# Patient Record
Sex: FEMALE | Race: WHITE | NOT HISPANIC OR LATINO
[De-identification: names, ages, dates, MRNs, and addresses within clinical notes are randomized per-mention and may not be internally consistent; named-entity substitution may affect disease eponyms.]

---

## 2019-07-25 PROBLEM — Z00.00 ENCOUNTER FOR PREVENTIVE HEALTH EXAMINATION: Status: ACTIVE | Noted: 2019-07-25

## 2019-08-12 ENCOUNTER — APPOINTMENT (OUTPATIENT)
Dept: BREAST CENTER | Facility: CLINIC | Age: 54
End: 2019-08-12
Payer: COMMERCIAL

## 2019-08-12 VITALS — BODY MASS INDEX: 28.34 KG/M2 | WEIGHT: 154 LBS | HEIGHT: 62 IN

## 2019-08-12 VITALS — DIASTOLIC BLOOD PRESSURE: 59 MMHG | HEART RATE: 66 BPM | SYSTOLIC BLOOD PRESSURE: 107 MMHG

## 2019-08-12 DIAGNOSIS — H81.01 MENIERE'S DISEASE, RIGHT EAR: ICD-10-CM

## 2019-08-12 DIAGNOSIS — N63.20 UNSPECIFIED LUMP IN THE LEFT BREAST, UNSPECIFIED QUADRANT: ICD-10-CM

## 2019-08-12 DIAGNOSIS — Z78.9 OTHER SPECIFIED HEALTH STATUS: ICD-10-CM

## 2019-08-12 DIAGNOSIS — R92.8 OTHER ABNORMAL AND INCONCLUSIVE FINDINGS ON DIAGNOSTIC IMAGING OF BREAST: ICD-10-CM

## 2019-08-12 DIAGNOSIS — Z80.9 FAMILY HISTORY OF MALIGNANT NEOPLASM, UNSPECIFIED: ICD-10-CM

## 2019-08-12 PROCEDURE — 99243 OFF/OP CNSLTJ NEW/EST LOW 30: CPT

## 2019-08-12 RX ORDER — TRIAMTERENE AND HYDROCHLOROTHIAZIDE 25; 37.5 MG/1; MG/1
37.5-25 TABLET ORAL
Refills: 0 | Status: ACTIVE | COMMUNITY

## 2019-08-12 RX ORDER — OMEGA-3/DHA/EPA/FISH OIL 300-1000MG
CAPSULE ORAL
Refills: 0 | Status: ACTIVE | COMMUNITY

## 2019-08-12 NOTE — HISTORY OF PRESENT ILLNESS
[FreeTextEntry1] : 53 year old female referred by Dr. Gavin Saleh (OB/GYN) presents for consultation regarding left breast fibroepithelial lesion found on recent core biopsy. \par \par Denies any palpable abnormalities, no skin changes no nipple discharge. Patient states she has had left breast lesion 1:00 since first detected in 2008.\par 7/12/19--b/l smmg/US, left dx mmg results showing breasts composed of greater amounts of fat than fibroglandular tissue, left breast 1:00 1cm nodule with somewhat lobulated appearance slightly increased in size, recommended for US guided core biopsy. BR 4. \par 7/22/19 ([facility])--left breast 1:00 5cmFN US core pathology cellular fibroepithelial lesion, excision required for a more definitive diagnosis. \par \par 2 pregnancies. 1 child (1-boy). LMP 7/29/19\par No family h/o breast or ovarian cancer\par Family hx: maternal grandmother dx with with unknown cancer in her late 80's. \par

## 2019-08-12 NOTE — PHYSICAL EXAM
[Bra Size: ___] : Bra Size: [unfilled] [Normocephalic] : normocephalic [Atraumatic] : atraumatic [Supple] : supple [No Supraclavicular Adenopathy] : no supraclavicular adenopathy [Examined in the supine and seated position] : examined in the supine and seated position [No Thyromegaly] : no thyromegaly [No dominant masses] : no dominant masses in right breast  [No dominant masses] : no dominant masses left breast [No Nipple Retraction] : no left nipple retraction [No Nipple Discharge] : no left nipple discharge [No Axillary Lymphadenopathy] : no left axillary lymphadenopathy [No Edema] : no edema [No Swelling] : no swelling [No Rashes] : no rashes [Full ROM] : full range of motion [No Ulceration] : no ulceration

## 2019-08-12 NOTE — PAST MEDICAL HISTORY
[Menarche Age ____] : age at menarche was [unfilled] [Definite ___ (Date)] : the last menstrual period was [unfilled] [Live Births ___] : P[unfilled]  [Total Preg ___] : G[unfilled] [Living ___] : Living: [unfilled] [Age At Live Birth ___] : Age at live birth: [unfilled] [FreeTextEntry6] : IVF treatment (at least 6 cycles)  [FreeTextEntry5] :   [FreeTextEntry8] : x 6 months  [FreeTextEntry7] : OCP use for >10 years (Loestrin)

## 2019-08-12 NOTE — ASSESSMENT
[FreeTextEntry1] : 53 year old female referred by Dr. Gavin Saleh (OB/GYN) presents for consultation regarding left breast fibroepithelial lesion found on recent core biopsy. \par \par Denies any palpable abnormalities, no skin changes no nipple discharge. Patient states she has had left breast lesion 1:00 since first detected in 2008.\par 7/12/19--b/l smmg/US, left dx mmg results showing breasts composed of greater amounts of fat than fibroglandular tissue, left breast 1:00 1cm nodule with somewhat lobulated appearance slightly increased in size, recommended for US guided core biopsy. BR 4. \par 7/22/19 ([facility])--left breast 1:00 5cmFN US core pathology cellular fibroepithelial lesion, excision required for a more definitive diagnosis. \par \par 2 pregnancies. 1 child (1-boy). LMP 7/29/19\par No family h/o breast or ovarian cancer\par Family hx: maternal grandmother dx with with unknown cancer in her late 80's. \par CBE:ALETHEA.\par Reviewed with NAEEM BLAKELY  the results of  L  breast biopsy from [facility] on  07/22/2019 showing cellular fibroepithelial lesion . Rec is for (localized) excisional biopsy given pathology.  Reviewed the procedure for (wire localized vs malcom ) excisional biopsy with pros and cons of either. Pt opts for the wire localized exc bx. May need additonal treatment, including another surgery pending final surgical pathology, ie: phyllodes, malignancy, atypia etc. Reviewed surgery and risks and complications including, pain, anesth, infection, bruising, bleeding. Pt would prefer IV sedation over general. Agrees to proceed with scheduling the surgery. Will need slide review and CDs prior to surgery. \par

## 2019-08-20 ENCOUNTER — RESULT REVIEW (OUTPATIENT)
Age: 54
End: 2019-08-20

## 2019-08-20 ENCOUNTER — OUTPATIENT (OUTPATIENT)
Dept: OUTPATIENT SERVICES | Facility: HOSPITAL | Age: 54
LOS: 1 days | End: 2019-08-20
Payer: COMMERCIAL

## 2019-08-20 DIAGNOSIS — D24.2 BENIGN NEOPLASM OF LEFT BREAST: ICD-10-CM

## 2019-08-20 PROCEDURE — 88321 CONSLTJ&REPRT SLD PREP ELSWR: CPT

## 2019-08-21 LAB — SURGICAL PATHOLOGY STUDY: SIGNIFICANT CHANGE UP

## 2019-09-16 ENCOUNTER — MESSAGE (OUTPATIENT)
Age: 54
End: 2019-09-16

## 2019-09-17 VITALS
OXYGEN SATURATION: 100 % | RESPIRATION RATE: 16 BRPM | HEIGHT: 62 IN | DIASTOLIC BLOOD PRESSURE: 58 MMHG | WEIGHT: 153 LBS | SYSTOLIC BLOOD PRESSURE: 114 MMHG | TEMPERATURE: 98 F | HEART RATE: 67 BPM

## 2019-09-17 NOTE — ASU PATIENT PROFILE, ADULT - PMH
Meniere disease    Neoplasm  benign neoplasm of the left breast  Thalassemia minor HTN (hypertension)    Meniere disease    Neoplasm  benign neoplasm of the left breast  Thalassemia minor

## 2019-09-18 ENCOUNTER — APPOINTMENT (OUTPATIENT)
Dept: MAMMOGRAPHY | Facility: HOSPITAL | Age: 54
End: 2019-09-18

## 2019-09-18 ENCOUNTER — OUTPATIENT (OUTPATIENT)
Dept: OUTPATIENT SERVICES | Facility: HOSPITAL | Age: 54
LOS: 1 days | Discharge: HOME | End: 2019-09-18
Payer: COMMERCIAL

## 2019-09-18 ENCOUNTER — RESULT REVIEW (OUTPATIENT)
Age: 54
End: 2019-09-18

## 2019-09-18 ENCOUNTER — APPOINTMENT (OUTPATIENT)
Dept: BREAST CENTER | Facility: HOSPITAL | Age: 54
End: 2019-09-18
Payer: COMMERCIAL

## 2019-09-18 VITALS
DIASTOLIC BLOOD PRESSURE: 58 MMHG | OXYGEN SATURATION: 99 % | RESPIRATION RATE: 15 BRPM | TEMPERATURE: 98 F | SYSTOLIC BLOOD PRESSURE: 94 MMHG | HEART RATE: 71 BPM

## 2019-09-18 DIAGNOSIS — L05.91 PILONIDAL CYST WITHOUT ABSCESS: Chronic | ICD-10-CM

## 2019-09-18 PROCEDURE — 19281 PERQ DEVICE BREAST 1ST IMAG: CPT | Mod: LT

## 2019-09-18 PROCEDURE — 19125 EXCISION BREAST LESION: CPT

## 2019-09-18 PROCEDURE — C1819: CPT

## 2019-09-18 PROCEDURE — 19281 PERQ DEVICE BREAST 1ST IMAG: CPT

## 2019-09-18 PROCEDURE — 76098 X-RAY EXAM SURGICAL SPECIMEN: CPT | Mod: 26

## 2019-09-18 PROCEDURE — 19125 EXCISION BREAST LESION: CPT | Mod: LT

## 2019-09-18 PROCEDURE — 88305 TISSUE EXAM BY PATHOLOGIST: CPT

## 2019-09-18 PROCEDURE — 88307 TISSUE EXAM BY PATHOLOGIST: CPT

## 2019-09-18 PROCEDURE — 76098 X-RAY EXAM SURGICAL SPECIMEN: CPT

## 2019-09-18 RX ORDER — ACETAMINOPHEN 500 MG
650 TABLET ORAL EVERY 6 HOURS
Refills: 0 | Status: DISCONTINUED | OUTPATIENT
Start: 2019-09-18 | End: 2019-09-18

## 2019-09-18 RX ORDER — ACETAMINOPHEN WITH CODEINE 300MG-30MG
1 TABLET ORAL
Qty: 5 | Refills: 0
Start: 2019-09-18

## 2019-09-18 RX ORDER — ONDANSETRON 8 MG/1
4 TABLET, FILM COATED ORAL ONCE
Refills: 0 | Status: DISCONTINUED | OUTPATIENT
Start: 2019-09-18 | End: 2019-09-18

## 2019-09-18 RX ORDER — HYDROMORPHONE HYDROCHLORIDE 2 MG/ML
0.5 INJECTION INTRAMUSCULAR; INTRAVENOUS; SUBCUTANEOUS ONCE
Refills: 0 | Status: DISCONTINUED | OUTPATIENT
Start: 2019-09-18 | End: 2019-09-18

## 2019-09-18 NOTE — PACU DISCHARGE NOTE - COMMENTS
Patient meets criteria for patient discharge, A&Ox4, VSS, No pain reported, Ace bandage wrap around chest in place as MD placed it, No discharge seen, patient up in recliner-drank water and cranberry juice, ate august and ritz crackers with no nausea, patient voided 350ml-walked to bathroom with standby assist without dizziness, # 20 gauge IV removed fully intact from right hand, discharge instruction reviewed with patient-all questions answered- patient verbalized understanding with read back of instructions, patient will call Dr. Cate Fish in AM to make followup appointment, patient with be driven home by , patient and  escorted to lobby with patient in wheelchair by SHELLI Barajas.

## 2019-09-18 NOTE — BRIEF OPERATIVE NOTE - OPERATION/FINDINGS
Left breast mass removed with the help of needle localisation, confirmed with specimen radiography. Haemostasis achieved. Skin closed with vicryl and monocryl Left breast mass removed with the help of needle localization, confirmed with specimen radiography, prior clip also removed. Hemostasis achieved. Skin closed with vicryl and monocryl

## 2019-09-20 PROBLEM — D56.3 THALASSEMIA MINOR: Chronic | Status: ACTIVE | Noted: 2019-09-17

## 2019-09-20 PROBLEM — H81.09 MENIERE'S DISEASE, UNSPECIFIED EAR: Chronic | Status: ACTIVE | Noted: 2019-09-17

## 2019-09-20 PROBLEM — D49.9 NEOPLASM OF UNSPECIFIED BEHAVIOR OF UNSPECIFIED SITE: Chronic | Status: ACTIVE | Noted: 2019-09-17

## 2019-09-25 LAB — SURGICAL PATHOLOGY STUDY: SIGNIFICANT CHANGE UP

## 2019-09-27 ENCOUNTER — APPOINTMENT (OUTPATIENT)
Dept: BREAST CENTER | Facility: CLINIC | Age: 54
End: 2019-09-27
Payer: COMMERCIAL

## 2019-09-27 VITALS
HEIGHT: 62 IN | SYSTOLIC BLOOD PRESSURE: 89 MMHG | WEIGHT: 163 LBS | BODY MASS INDEX: 30 KG/M2 | HEART RATE: 68 BPM | DIASTOLIC BLOOD PRESSURE: 52 MMHG

## 2019-09-27 PROCEDURE — 99024 POSTOP FOLLOW-UP VISIT: CPT

## 2019-09-27 NOTE — HISTORY OF PRESENT ILLNESS
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 Left breast fibroepithelial lesion additional posterior margin pathology breast parenchyma with fibrocystic changes. VIRAL risk calculator 32% \par Pt doing well post op. \par \par 7/12/19--b/l smmg/US, left dx mmg results showing breasts composed of greater amounts of fat than fibroglandular tissue, left breast 1:00 1cm nodule with somewhat lobulated appearance slightly increased in size, recommended for US guided core biopsy. BR 4. \par 7/22/19 ([facility])--left breast 1:00 5cmFN US core pathology cellular fibroepithelial lesion, excision required for a more definitive diagnosis. \par 9/18/19 (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 Left breast fibroepithelial lesion additional posterior margin pathology breast parenchyma with fibrocystic changes. \par \par 2 pregnancies. 1 child (1-boy). LMP 7/29/19\par No family h/o breast or ovarian cancer\par Family hx: maternal grandmother dx with with unknown cancer in her late 80's. \par

## 2019-09-27 NOTE — DATA REVIEWED
[FreeTextEntry1] : 7/12/19--b/l smmg/US, left dx mmg results showing breasts composed of greater amounts of fat than fibroglandular tissue, left breast 1:00 1cm nodule with somewhat lobulated appearance slightly increased in size, recommended for US guided core biopsy. BR 4. \par 7/22/19 ([facility])--left breast 1:00 5cmFN US core pathology cellular fibroepithelial lesion, excision required for a more definitive diagnosis. 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 Left breast fibroepithelial lesion additional posterior margin pathology breast parenchyma with fibrocystic changes.

## 2019-09-27 NOTE — ASSESSMENT
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 Left breast fibroepithelial lesion additional posterior margin pathology breast parenchyma with fibrocystic changes. VIRAL risk calculator 32% \par Pt doing well post op. \par \par 7/12/19--b/l smmg/US, left dx mmg results showing breasts composed of greater amounts of fat than fibroglandular tissue, left breast 1:00 1cm nodule with somewhat lobulated appearance slightly increased in size, recommended for US guided core biopsy. BR 4. \par 7/22/19 ([facility])--left breast 1:00 5cmFN US core pathology cellular fibroepithelial lesion, excision required for a more definitive diagnosis. \par 9/18/19 (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 Left breast fibroepithelial lesion additional posterior margin pathology breast parenchyma with fibrocystic changes. \par \par 2 pregnancies. 1 child (1-boy). LMP 7/29/19\par No family h/o breast or ovarian cancer\par Family hx: maternal grandmother dx with with unknown cancer in her late 80's. \par CBE: Left PA incision intact and healing well. No cellulitis\par Reviewed with pt, results of pathology. Benign phyllodes, negative margins, reexcision not needed. Discussed risk of recurrence and would rec close f/u.  There is also ADH and increased LT risk of breast cancer. Rec medical oncology consult, card for Dr. Causey given.  Also high risk and rec alternating 6 mos mmg and u/s with MRI. Can do bilat 2/20 and MRI 8/20, works better for pt's schedule.

## 2019-11-08 ENCOUNTER — APPOINTMENT (OUTPATIENT)
Dept: BREAST CENTER | Facility: CLINIC | Age: 54
End: 2019-11-08

## 2019-11-25 ENCOUNTER — APPOINTMENT (OUTPATIENT)
Dept: BREAST CENTER | Facility: CLINIC | Age: 54
End: 2019-11-25
Payer: COMMERCIAL

## 2019-11-25 VITALS
SYSTOLIC BLOOD PRESSURE: 123 MMHG | TEMPERATURE: 98.4 F | HEART RATE: 76 BPM | HEIGHT: 62 IN | DIASTOLIC BLOOD PRESSURE: 69 MMHG

## 2019-11-25 PROCEDURE — 99024 POSTOP FOLLOW-UP VISIT: CPT

## 2019-11-25 NOTE — HISTORY OF PRESENT ILLNESS
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 Left breast fibroepithelial lesion additional posterior margin pathology breast parenchyma with fibrocystic changes. VIRAL risk calculator 32% \par Pt doing well, got a massage 3 weeks ago and laid on left breast and now slightly tender but not requiring Rx.  \par Saw Dr. Causey and still thinking about the tamoxifen. \par \par 7/12/19--b/l smmg/US, left dx mmg results showing breasts composed of greater amounts of fat than fibroglandular tissue, left breast 1:00 1cm nodule with somewhat lobulated appearance slightly increased in size, recommended for US guided core biopsy. BR 4. \par 7/22/19 ([facility])--left breast 1:00 5cmFN US core pathology cellular fibroepithelial lesion, excision required for a more definitive diagnosis. \par 9/18/19 (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 Left breast fibroepithelial lesion additional posterior margin pathology breast parenchyma with fibrocystic changes. \par \par 2 pregnancies. 1 child (1-boy). LMP 7/29/19\par No family h/o breast or ovarian cancer\par Family hx: maternal grandmother dx with with unknown cancer in her late 80's. \par \par \par  \par

## 2019-11-25 NOTE — ASSESSMENT
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 Left breast fibroepithelial lesion additional posterior margin pathology breast parenchyma with fibrocystic changes. VIRAL risk calculator 32% \par Pt doing well, got a massage 3 weeks ago and laid on left breast and now slightly tender but not requiring Rx.  \par Saw Dr. Causey and still thinking about the tamoxifen. \par \par 7/12/19--b/l smmg/US, left dx mmg results showing breasts composed of greater amounts of fat than fibroglandular tissue, left breast 1:00 1cm nodule with somewhat lobulated appearance slightly increased in size, recommended for US guided core biopsy. BR 4. \par 7/22/19 ([facility])--left breast 1:00 5cmFN US core pathology cellular fibroepithelial lesion, excision required for a more definitive diagnosis. 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 Left breast fibroepithelial lesion additional posterior margin pathology breast parenchyma with fibrocystic changes. \par \par 2 pregnancies. 1 child (1-boy). LMP 7/29/19\par No family h/o breast or ovarian cancer\par Family hx: maternal grandmother dx with with unknown cancer in her late 80's. \par CBE: Left PA inc healing well. \par

## 2020-02-13 ENCOUNTER — FORM ENCOUNTER (OUTPATIENT)
Age: 55
End: 2020-02-13

## 2020-02-14 ENCOUNTER — OUTPATIENT (OUTPATIENT)
Dept: OUTPATIENT SERVICES | Facility: HOSPITAL | Age: 55
LOS: 1 days | End: 2020-02-14
Payer: COMMERCIAL

## 2020-02-14 ENCOUNTER — APPOINTMENT (OUTPATIENT)
Dept: MRI IMAGING | Facility: HOSPITAL | Age: 55
End: 2020-02-14
Payer: COMMERCIAL

## 2020-02-14 DIAGNOSIS — N60.01 SOLITARY CYST OF RIGHT BREAST: ICD-10-CM

## 2020-02-14 DIAGNOSIS — L05.91 PILONIDAL CYST WITHOUT ABSCESS: Chronic | ICD-10-CM

## 2020-02-14 DIAGNOSIS — Z90.12 ACQUIRED ABSENCE OF LEFT BREAST AND NIPPLE: ICD-10-CM

## 2020-02-14 PROCEDURE — A9585: CPT

## 2020-02-14 PROCEDURE — C8908: CPT

## 2020-02-14 PROCEDURE — C8937: CPT

## 2020-02-14 PROCEDURE — 77049 MRI BREAST C-+ W/CAD BI: CPT | Mod: 26

## 2020-02-14 PROCEDURE — 77049 MRI BREAST C-+ W/CAD BI: CPT

## 2020-05-21 ENCOUNTER — APPOINTMENT (OUTPATIENT)
Dept: MAMMOGRAPHY | Facility: HOSPITAL | Age: 55
End: 2020-05-21

## 2020-05-21 ENCOUNTER — APPOINTMENT (OUTPATIENT)
Dept: ULTRASOUND IMAGING | Facility: HOSPITAL | Age: 55
End: 2020-05-21

## 2020-08-14 ENCOUNTER — APPOINTMENT (OUTPATIENT)
Dept: BREAST CENTER | Facility: CLINIC | Age: 55
End: 2020-08-14
Payer: COMMERCIAL

## 2020-08-14 VITALS
DIASTOLIC BLOOD PRESSURE: 66 MMHG | HEART RATE: 64 BPM | SYSTOLIC BLOOD PRESSURE: 104 MMHG | BODY MASS INDEX: 29.26 KG/M2 | WEIGHT: 159 LBS | HEIGHT: 62 IN

## 2020-08-14 DIAGNOSIS — D24.2 BENIGN NEOPLASM OF LEFT BREAST: ICD-10-CM

## 2020-08-14 DIAGNOSIS — R92.8 OTHER ABNORMAL AND INCONCLUSIVE FINDINGS ON DIAGNOSTIC IMAGING OF BREAST: ICD-10-CM

## 2020-08-14 PROCEDURE — 99214 OFFICE O/P EST MOD 30 MIN: CPT

## 2020-08-14 RX ORDER — FEXOFENADINE HCL 60 MG
CAPSULE ORAL
Refills: 0 | Status: DISCONTINUED | COMMUNITY
End: 2020-08-14

## 2020-08-14 NOTE — HISTORY OF PRESENT ILLNESS
[FreeTextEntry1] : 54 year old female former [facility] pt here for follow up.  S/P L wire loc excisional Bx for phyllodes tumor with small focus of ADH on 9/18/19.\par Pt is due for MRI f.u now from 2/20 MRI and she was to get bilat mmg 7/20 but was rescheduled to Oct per [facility]. \par Pt denies any breast lesions, discharge or masses.\par \par --2/14/20 [facility] MRI: baseline, correlated with mmg 7/19/19, 7/12/19, 8/17/16, scattered nonspecific foci noted chelly, R central (1.1cm) cyst w adjacent (0.4cm) focus of enhancement anteriorly, likely secondary to cyst inflammation; L UOQ post op changes, UOQ (2.4cm) post op seroma w thin rim of enhancement.  However there is a (0.6cm) focal area of slightly nodular enhancement at the anterior aspect of seroma. No axillary or IMLN adenopathy noted.BR3\par 6 mo f/u MRI rec for 6mm focal enhancement upper outer L to anterior aspect of seroma and R 1.1cm cyst central posterior.\par \par Was referred to Dr. Causey for discussion about starting tamoxifen and decided not to start it.  VIRAL risk calculator 32%\par \par --7/12/19--b/l smmg/US, left dx mmg results showing breasts composed of greater amounts of fat than fibroglandular tissue, left breast 1:00 1cm nodule with somewhat lobulated appearance slightly increased in size, recommended for US guided core biopsy. BR 4. \par --7/22/19 ([facility])--left breast 1:00 5cmFN US core pathology cellular fibroepithelial lesion, excision required for a more definitive diagnosis. 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 Left breast fibroepithelial lesion additional posterior margin pathology breast parenchyma with fibrocystic changes. \par \par No family h/o breast or ovarian cancer, mGM dx with with unknown cancer in her late 80's.

## 2020-08-14 NOTE — PHYSICAL EXAM
[Normocephalic] : normocephalic [No Supraclavicular Adenopathy] : no supraclavicular adenopathy [Atraumatic] : atraumatic [Supple] : supple [No Thyromegaly] : no thyromegaly [Symmetrical] : symmetrical [Examined in the supine and seated position] : examined in the supine and seated position [Bra Size: ___] : Bra Size: [unfilled] [No dominant masses] : no dominant masses in right breast  [No Nipple Retraction] : no left nipple retraction [No dominant masses] : no dominant masses left breast [No Nipple Discharge] : no left nipple discharge [No Axillary Lymphadenopathy] : no left axillary lymphadenopathy [No Swelling] : no swelling [No Edema] : no edema [No Ulceration] : no ulceration [Full ROM] : full range of motion [No Rashes] : no rashes [de-identified] : PA scar, well healed

## 2020-08-14 NOTE — ASSESSMENT
[FreeTextEntry1] : 54 year old female former [facility] pt here for follow up.  S/P L wire loc excisional Bx for phyllodes tumor with small focus of ADH on 9/18/19.\par Pt is due for MRI f.u now from 2/20 MRI and she was to get bilat mmg 7/20 but was rescheduled to Oct per [facility]. \par Pt denies any breast lesions, discharge or masses.\par \par --2/14/20 [facility] MRI: baseline, correlated with mmg 7/19/19, 7/12/19, 8/17/16, scattered nonspecific foci noted chelly, R central (1.1cm) cyst w adjacent (0.4cm) focus of enhancement anteriorly, likely secondary to cyst inflammation; L UOQ post op changes, UOQ (2.4cm) post op seroma w thin rim of enhancement.  However there is a (0.6cm) focal area of slightly nodular enhancement at the anterior aspect of seroma. No axillary or IMLN adenopathy noted.BR3\par 6 mo f/u MRI rec for 6mm focal enhancement upper outer L to anterior aspect of seroma and R 1.1cm cyst central posterior.\par \par Was referred to Dr. Causey for discussion about starting tamoxifen and decided not to start it.  VIRAL risk calculator 32%\par \par --7/12/19--b/l smmg/US, left dx mmg results showing breasts composed of greater amounts of fat than fibroglandular tissue, left breast 1:00 1cm nodule with somewhat lobulated appearance slightly increased in size, recommended for US guided core biopsy. BR 4. \par --7/22/19 ([facility])--left breast 1:00 5cmFN US core pathology cellular fibroepithelial lesion, excision required for a more definitive diagnosis. 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 Left breast fibroepithelial lesion additional posterior margin pathology breast parenchyma with fibrocystic changes. \par \par No family h/o breast or ovarian cancer, mGM dx with with unknown cancer in her late 80's. \par CBE: ALETHEA. Well healed PA scar on left. Full ROM and no lymphadenopathy.\par Reviewed results MRI with pt.  Rec is for 6 mos f.u MRI due now and bilat mmg and u/s also due now.  Further f.u and tx pending results.

## 2020-08-14 NOTE — PAST MEDICAL HISTORY
[Menarche Age ____] : age at menarche was [unfilled] [Total Preg ___] : G[unfilled] [Live Births ___] : P[unfilled]  [Living ___] : Living: [unfilled] [Age At Live Birth ___] : Age at live birth: [unfilled] [FreeTextEntry5] :   [FreeTextEntry7] : OCP use for >10 years (Loestrin)  [FreeTextEntry6] : IVF treatment (at least 6 cycles)  [FreeTextEntry8] : x 6 months

## 2020-09-22 ENCOUNTER — RESULT REVIEW (OUTPATIENT)
Age: 55
End: 2020-09-22

## 2020-09-22 ENCOUNTER — APPOINTMENT (OUTPATIENT)
Dept: MAMMOGRAPHY | Facility: HOSPITAL | Age: 55
End: 2020-09-22

## 2020-09-22 ENCOUNTER — OUTPATIENT (OUTPATIENT)
Dept: OUTPATIENT SERVICES | Facility: HOSPITAL | Age: 55
LOS: 1 days | End: 2020-09-22
Payer: COMMERCIAL

## 2020-09-22 ENCOUNTER — APPOINTMENT (OUTPATIENT)
Dept: ULTRASOUND IMAGING | Facility: HOSPITAL | Age: 55
End: 2020-09-22

## 2020-09-22 ENCOUNTER — APPOINTMENT (OUTPATIENT)
Dept: MRI IMAGING | Facility: HOSPITAL | Age: 55
End: 2020-09-22

## 2020-09-22 DIAGNOSIS — D24.2 BENIGN NEOPLASM OF LEFT BREAST: ICD-10-CM

## 2020-09-22 DIAGNOSIS — L05.91 PILONIDAL CYST WITHOUT ABSCESS: Chronic | ICD-10-CM

## 2020-09-22 DIAGNOSIS — N60.92 UNSPECIFIED BENIGN MAMMARY DYSPLASIA OF LEFT BREAST: ICD-10-CM

## 2020-09-22 PROCEDURE — 77049 MRI BREAST C-+ W/CAD BI: CPT | Mod: 26

## 2020-09-22 PROCEDURE — 77063 BREAST TOMOSYNTHESIS BI: CPT | Mod: 26

## 2020-09-22 PROCEDURE — 76641 ULTRASOUND BREAST COMPLETE: CPT | Mod: 26,50

## 2020-09-22 PROCEDURE — A9585: CPT

## 2020-09-22 PROCEDURE — 77063 BREAST TOMOSYNTHESIS BI: CPT

## 2020-09-22 PROCEDURE — 77067 SCR MAMMO BI INCL CAD: CPT | Mod: 26

## 2020-09-22 PROCEDURE — 77067 SCR MAMMO BI INCL CAD: CPT

## 2020-09-22 PROCEDURE — C8937: CPT

## 2020-09-22 PROCEDURE — C8908: CPT

## 2020-09-22 PROCEDURE — 76641 ULTRASOUND BREAST COMPLETE: CPT

## 2020-09-22 PROCEDURE — 77049 MRI BREAST C-+ W/CAD BI: CPT

## 2020-10-01 ENCOUNTER — APPOINTMENT (OUTPATIENT)
Dept: MAMMOGRAPHY | Facility: HOSPITAL | Age: 55
End: 2020-10-01
Payer: COMMERCIAL

## 2020-10-01 ENCOUNTER — APPOINTMENT (OUTPATIENT)
Dept: ULTRASOUND IMAGING | Facility: HOSPITAL | Age: 55
End: 2020-10-01
Payer: COMMERCIAL

## 2020-10-16 ENCOUNTER — APPOINTMENT (OUTPATIENT)
Dept: BREAST CENTER | Facility: CLINIC | Age: 55
End: 2020-10-16
Payer: COMMERCIAL

## 2020-10-16 DIAGNOSIS — N60.92 UNSPECIFIED BENIGN MAMMARY DYSPLASIA OF LEFT BREAST: ICD-10-CM

## 2020-10-16 DIAGNOSIS — D24.2 BENIGN NEOPLASM OF LEFT BREAST: ICD-10-CM

## 2020-10-16 PROCEDURE — 99214 OFFICE O/P EST MOD 30 MIN: CPT | Mod: 95

## 2020-10-16 NOTE — PAST MEDICAL HISTORY
[Menarche Age ____] : age at menarche was [unfilled] [Total Preg ___] : G[unfilled] [Live Births ___] : P[unfilled]  [Living ___] : Living: [unfilled] [Age At Live Birth ___] : Age at live birth: [unfilled] [FreeTextEntry6] : IVF treatment (at least 6 cycles)  [FreeTextEntry5] :   [FreeTextEntry8] : x 6 months  [FreeTextEntry7] : OCP use for >10 years (Loestrin)

## 2020-10-16 NOTE — HISTORY OF PRESENT ILLNESS
[FreeTextEntry1] : 55 year old female former [facility] pt here for follow up TEB.  S/P L wire loc excisional Bx for phyllodes tumor with small focus of ADH on 9/18/19.  Here for result review.  \par \par Was referred to Dr. Causey (med Onc) and was recommended Tamoxifen but declined. \par FHx mGM dx with with unknown cancer in her late 80's. VIRAL risk calculator 32%\par \par --9/22/20 [facility] SmmgT/US: compared to 7/12/19, 8/17/16, FG/fatty, UOQ (2.0cm) fat mass w clip consistent w fat necrosis, no susp findings.BR2 \par --9/22/20 [facility]: [facility] US: 7/12/19, 8/17/16, R unremarkable; L 2:00 (1.8x1.6cm) underlying lumpectomy scar poorly mixed cystic/solid lesions consistent w fat necrosis seen on mmg; no lymphadenopathy noted bilaterally. BR2\par --9/22/20 [facility] MRI: compared to mmg/US 2019,2016, few scattered enhancing nonspecific foci bilaterally, R central posterior non-mass enhancement previously dec from prior study; L UOQ post op changes, UOQ (2cm) fat necrosis at lumpectomy site, no susp findings. No lymphadenopathy or chest wall abnormality. BR2\par \par --7/22/19 ([facility]) US core Bx: L 1:00 N5 cellular fibroepithelial lesion, excision required for a more definitive diagnosis. \par --9/18/19 ([facility])--surgical path: benign phyllodes tumor, small focus ADH assoc w phyllodes tumor, previous biopsy site changes, margins uninvolved. \par \par No family h/o breast or ovarian cancer, mGM dx with with unknown cancer in her late 80's.

## 2020-10-16 NOTE — DATA REVIEWED
[FreeTextEntry1] : --9/22/20 [facility] SmmgT/US: compared to 7/12/19, 8/17/16, FG/fatty, UOQ (2.0cm) fat mass w clip consistent w fat necrosis, no susp findings.BR2 \par --9/22/20 [facility]: [facility] US: 7/12/19, 8/17/16, R unremarkable; L 2:00 (1.8x1.6cm) underlying lumpectomy scar poorly mixed cystic/solid lesions consistent w fat necrosis seen on mmg; no lymphadenopathy noted bilaterally. BR2\par \par --9/22/20 [facility] MRI: compared to mmg/US 2019,2016, few scattered enhancing nonspecific foci bilaterally, R central posterior non-mass enhancement previously dec from prior study; L UOQ post op changes, UOQ (2cm) fat necrosis at lumpectomy site, no susp findings. No lymphadenopathy or chest wall abnormality. BR2

## 2020-10-16 NOTE — REASON FOR VISIT
[Follow-Up: _____] : a [unfilled] follow-up visit [Home] : at home, [unfilled] , at the time of the visit. [Medical Office: ([facility])___] : at the medical office located in  [Verbal consent obtained from patient] : the patient, [unfilled] [FreeTextEntry1] : mmg/US and MRI results.

## 2020-10-16 NOTE — ASSESSMENT
[FreeTextEntry1] : 55 year old female former [facility] pt here for follow up TEB.  S/P L wire loc excisional Bx for phyllodes tumor with small focus of ADH on 9/18/19.  Here for result review.  \par \par Was referred to Dr. Causey (med Onc) and was recommended Tamoxifen but declined. \par FHx mGM dx with with unknown cancer in her late 80's. VIRAL risk calculator 32%\par \par --9/22/20 [facility] Maurizio SmmgT/US: compared to 7/12/19, 8/17/16, FG/fatty, UOQ (2.0cm) fat mass w clip consistent w fat necrosis, no susp findings.BR2 \par --9/22/20 [facility]: Maurizio US: 7/12/19, 8/17/16, R unremarkable; L 2:00 (1.8x1.6cm) underlying lumpectomy scar poorly mixed cystic/solid lesions consistent w fat necrosis seen on mmg; no lymphadenopathy noted bilaterally. BR2\par --9/22/20 [facility] MRI: compared to mmg/US 2019,2016, few scattered enhancing nonspecific foci bilaterally, R central posterior non-mass enhancement previously dec from prior study; L UOQ post op changes, UOQ (2cm) fat necrosis at lumpectomy site, no susp findings. No lymphadenopathy or chest wall abnormality. BR2\par \par --7/22/19 ([facility]) US core Bx: L 1:00 N5 cellular fibroepithelial lesion, excision required for a more definitive diagnosis. \par --9/18/19 ([facility])--surgical path: benign phyllodes tumor, small focus ADH assoc w phyllodes tumor, previous biopsy site changes, margins uninvolved. \par \par No family h/o breast or ovarian cancer, mGM dx with with unknown cancer in her late 80's. \par CBE: Well healed PA scar.\par Reviewed studies with pt. Rec is for 6 mos f.u mmg and u/s 3/21-given phyllodes and ADH would do 6 mos testing. If neg then MRI screening 9/21 and then f.u. Pt also get CBE with Dr. Causey, GYN and me, staggering.

## 2021-03-25 ENCOUNTER — APPOINTMENT (OUTPATIENT)
Dept: ULTRASOUND IMAGING | Facility: CLINIC | Age: 56
End: 2021-03-25
Payer: SELF-PAY

## 2021-03-25 ENCOUNTER — RESULT REVIEW (OUTPATIENT)
Age: 56
End: 2021-03-25

## 2021-03-25 ENCOUNTER — OUTPATIENT (OUTPATIENT)
Dept: OUTPATIENT SERVICES | Facility: HOSPITAL | Age: 56
LOS: 1 days | End: 2021-03-25

## 2021-03-25 ENCOUNTER — APPOINTMENT (OUTPATIENT)
Dept: MAMMOGRAPHY | Facility: CLINIC | Age: 56
End: 2021-03-25
Payer: SELF-PAY

## 2021-03-25 DIAGNOSIS — L05.91 PILONIDAL CYST WITHOUT ABSCESS: Chronic | ICD-10-CM

## 2021-03-25 PROCEDURE — 77063 BREAST TOMOSYNTHESIS BI: CPT | Mod: 26

## 2021-03-25 PROCEDURE — 77067 SCR MAMMO BI INCL CAD: CPT | Mod: 26

## 2021-03-25 PROCEDURE — 76641 ULTRASOUND BREAST COMPLETE: CPT | Mod: 26,50

## 2021-11-17 ENCOUNTER — APPOINTMENT (OUTPATIENT)
Dept: MRI IMAGING | Facility: CLINIC | Age: 56
End: 2021-11-17
Payer: COMMERCIAL

## 2021-11-17 ENCOUNTER — OUTPATIENT (OUTPATIENT)
Dept: OUTPATIENT SERVICES | Facility: HOSPITAL | Age: 56
LOS: 1 days | End: 2021-11-17

## 2021-11-17 DIAGNOSIS — L05.91 PILONIDAL CYST WITHOUT ABSCESS: Chronic | ICD-10-CM

## 2021-11-17 PROCEDURE — 77049 MRI BREAST C-+ W/CAD BI: CPT | Mod: 26

## 2021-12-01 ENCOUNTER — APPOINTMENT (OUTPATIENT)
Dept: MRI IMAGING | Facility: CLINIC | Age: 56
End: 2021-12-01
Payer: COMMERCIAL

## 2021-12-01 ENCOUNTER — RESULT REVIEW (OUTPATIENT)
Age: 56
End: 2021-12-01

## 2021-12-01 ENCOUNTER — OUTPATIENT (OUTPATIENT)
Dept: OUTPATIENT SERVICES | Facility: HOSPITAL | Age: 56
LOS: 1 days | End: 2021-12-01

## 2021-12-01 DIAGNOSIS — L05.91 PILONIDAL CYST WITHOUT ABSCESS: Chronic | ICD-10-CM

## 2021-12-01 PROCEDURE — 88305 TISSUE EXAM BY PATHOLOGIST: CPT | Mod: 26

## 2021-12-01 PROCEDURE — 19085 BX BREAST 1ST LESION MR IMAG: CPT | Mod: LT

## 2021-12-01 PROCEDURE — 77065 DX MAMMO INCL CAD UNI: CPT | Mod: 26,LT

## 2021-12-02 LAB — SURGICAL PATHOLOGY STUDY: SIGNIFICANT CHANGE UP

## 2022-04-13 ENCOUNTER — APPOINTMENT (OUTPATIENT)
Dept: MAMMOGRAPHY | Facility: HOSPITAL | Age: 57
End: 2022-04-13
Payer: COMMERCIAL

## 2022-04-13 ENCOUNTER — OUTPATIENT (OUTPATIENT)
Dept: OUTPATIENT SERVICES | Facility: HOSPITAL | Age: 57
LOS: 1 days | End: 2022-04-13
Payer: COMMERCIAL

## 2022-04-13 ENCOUNTER — APPOINTMENT (OUTPATIENT)
Dept: ULTRASOUND IMAGING | Facility: HOSPITAL | Age: 57
End: 2022-04-13
Payer: COMMERCIAL

## 2022-04-13 DIAGNOSIS — L05.91 PILONIDAL CYST WITHOUT ABSCESS: Chronic | ICD-10-CM

## 2022-04-13 PROCEDURE — 77067 SCR MAMMO BI INCL CAD: CPT

## 2022-04-13 PROCEDURE — 77063 BREAST TOMOSYNTHESIS BI: CPT | Mod: 26

## 2022-04-13 PROCEDURE — 77067 SCR MAMMO BI INCL CAD: CPT | Mod: 26

## 2022-04-13 PROCEDURE — 76641 ULTRASOUND BREAST COMPLETE: CPT | Mod: 26,50

## 2022-04-13 PROCEDURE — 76641 ULTRASOUND BREAST COMPLETE: CPT

## 2022-04-13 PROCEDURE — 77063 BREAST TOMOSYNTHESIS BI: CPT

## 2022-04-18 DIAGNOSIS — N63.21 UNSPECIFIED LUMP IN THE LEFT BREAST, UPPER OUTER QUADRANT: ICD-10-CM

## 2022-04-18 DIAGNOSIS — Z12.31 ENCOUNTER FOR SCREENING MAMMOGRAM FOR MALIGNANT NEOPLASM OF BREAST: ICD-10-CM

## 2022-04-18 DIAGNOSIS — R92.2 INCONCLUSIVE MAMMOGRAM: ICD-10-CM

## 2022-04-28 ENCOUNTER — RESULT REVIEW (OUTPATIENT)
Age: 57
End: 2022-04-28

## 2022-04-28 ENCOUNTER — OUTPATIENT (OUTPATIENT)
Dept: OUTPATIENT SERVICES | Facility: HOSPITAL | Age: 57
LOS: 1 days | End: 2022-04-28
Payer: COMMERCIAL

## 2022-04-28 ENCOUNTER — APPOINTMENT (OUTPATIENT)
Dept: ULTRASOUND IMAGING | Facility: HOSPITAL | Age: 57
End: 2022-04-28
Payer: COMMERCIAL

## 2022-04-28 DIAGNOSIS — L05.91 PILONIDAL CYST WITHOUT ABSCESS: Chronic | ICD-10-CM

## 2022-04-28 PROCEDURE — 77065 DX MAMMO INCL CAD UNI: CPT | Mod: 26,LT

## 2022-04-28 PROCEDURE — A4648: CPT

## 2022-04-28 PROCEDURE — 88305 TISSUE EXAM BY PATHOLOGIST: CPT

## 2022-04-28 PROCEDURE — 77065 DX MAMMO INCL CAD UNI: CPT

## 2022-04-28 PROCEDURE — 19083 BX BREAST 1ST LESION US IMAG: CPT | Mod: LT

## 2022-04-28 PROCEDURE — 88305 TISSUE EXAM BY PATHOLOGIST: CPT | Mod: 26

## 2022-04-28 PROCEDURE — 19083 BX BREAST 1ST LESION US IMAG: CPT

## 2022-04-29 LAB — SURGICAL PATHOLOGY STUDY: SIGNIFICANT CHANGE UP

## 2022-11-30 ENCOUNTER — OUTPATIENT (OUTPATIENT)
Dept: OUTPATIENT SERVICES | Facility: HOSPITAL | Age: 57
LOS: 1 days | End: 2022-11-30
Payer: COMMERCIAL

## 2022-11-30 ENCOUNTER — APPOINTMENT (OUTPATIENT)
Dept: MRI IMAGING | Facility: HOSPITAL | Age: 57
End: 2022-11-30

## 2022-11-30 DIAGNOSIS — L05.91 PILONIDAL CYST WITHOUT ABSCESS: Chronic | ICD-10-CM

## 2022-11-30 PROCEDURE — A9585: CPT

## 2022-11-30 PROCEDURE — C8937: CPT

## 2022-11-30 PROCEDURE — 77049 MRI BREAST C-+ W/CAD BI: CPT | Mod: 26

## 2022-11-30 PROCEDURE — C8908: CPT

## 2023-07-13 ENCOUNTER — APPOINTMENT (OUTPATIENT)
Dept: ULTRASOUND IMAGING | Facility: HOSPITAL | Age: 58
End: 2023-07-13
Payer: COMMERCIAL

## 2023-07-13 ENCOUNTER — OUTPATIENT (OUTPATIENT)
Dept: OUTPATIENT SERVICES | Facility: HOSPITAL | Age: 58
LOS: 1 days | End: 2023-07-13
Payer: COMMERCIAL

## 2023-07-13 ENCOUNTER — APPOINTMENT (OUTPATIENT)
Dept: MAMMOGRAPHY | Facility: HOSPITAL | Age: 58
End: 2023-07-13
Payer: COMMERCIAL

## 2023-07-13 DIAGNOSIS — L05.91 PILONIDAL CYST WITHOUT ABSCESS: Chronic | ICD-10-CM

## 2023-07-13 PROCEDURE — 76830 TRANSVAGINAL US NON-OB: CPT | Mod: 26

## 2023-07-13 PROCEDURE — 76641 ULTRASOUND BREAST COMPLETE: CPT | Mod: 26,50

## 2023-07-13 PROCEDURE — 77067 SCR MAMMO BI INCL CAD: CPT | Mod: 26

## 2023-07-13 PROCEDURE — 77063 BREAST TOMOSYNTHESIS BI: CPT

## 2023-07-13 PROCEDURE — 77063 BREAST TOMOSYNTHESIS BI: CPT | Mod: 26

## 2023-07-13 PROCEDURE — 76830 TRANSVAGINAL US NON-OB: CPT

## 2023-07-13 PROCEDURE — 77067 SCR MAMMO BI INCL CAD: CPT

## 2023-07-13 PROCEDURE — 76641 ULTRASOUND BREAST COMPLETE: CPT

## 2023-11-30 ENCOUNTER — APPOINTMENT (OUTPATIENT)
Dept: RADIOLOGY | Facility: CLINIC | Age: 58
End: 2023-11-30
Payer: COMMERCIAL

## 2023-11-30 ENCOUNTER — APPOINTMENT (OUTPATIENT)
Dept: MRI IMAGING | Facility: CLINIC | Age: 58
End: 2023-11-30
Payer: COMMERCIAL

## 2023-11-30 ENCOUNTER — OUTPATIENT (OUTPATIENT)
Dept: OUTPATIENT SERVICES | Facility: HOSPITAL | Age: 58
LOS: 1 days | End: 2023-11-30

## 2023-11-30 DIAGNOSIS — L05.91 PILONIDAL CYST WITHOUT ABSCESS: Chronic | ICD-10-CM

## 2023-11-30 PROCEDURE — 77049 MRI BREAST C-+ W/CAD BI: CPT | Mod: 26

## 2023-11-30 PROCEDURE — 77080 DXA BONE DENSITY AXIAL: CPT | Mod: 26

## 2024-07-15 ENCOUNTER — APPOINTMENT (OUTPATIENT)
Dept: MAMMOGRAPHY | Facility: CLINIC | Age: 59
End: 2024-07-15
Payer: COMMERCIAL

## 2024-07-15 ENCOUNTER — APPOINTMENT (OUTPATIENT)
Dept: ULTRASOUND IMAGING | Facility: CLINIC | Age: 59
End: 2024-07-15
Payer: COMMERCIAL

## 2024-07-15 ENCOUNTER — OUTPATIENT (OUTPATIENT)
Dept: OUTPATIENT SERVICES | Facility: HOSPITAL | Age: 59
LOS: 1 days | End: 2024-07-15

## 2024-07-15 DIAGNOSIS — L05.91 PILONIDAL CYST WITHOUT ABSCESS: Chronic | ICD-10-CM

## 2024-07-15 PROCEDURE — 76830 TRANSVAGINAL US NON-OB: CPT | Mod: 26

## 2024-07-15 PROCEDURE — 77063 BREAST TOMOSYNTHESIS BI: CPT | Mod: 26

## 2024-07-15 PROCEDURE — 76856 US EXAM PELVIC COMPLETE: CPT | Mod: 26

## 2024-07-15 PROCEDURE — 77067 SCR MAMMO BI INCL CAD: CPT | Mod: 26

## 2024-07-15 PROCEDURE — 76641 ULTRASOUND BREAST COMPLETE: CPT | Mod: 26,50

## 2024-12-10 ENCOUNTER — APPOINTMENT (OUTPATIENT)
Dept: MRI IMAGING | Facility: CLINIC | Age: 59
End: 2024-12-10
Payer: COMMERCIAL

## 2024-12-10 ENCOUNTER — OUTPATIENT (OUTPATIENT)
Dept: OUTPATIENT SERVICES | Facility: HOSPITAL | Age: 59
LOS: 1 days | End: 2024-12-10

## 2024-12-10 DIAGNOSIS — L05.91 PILONIDAL CYST WITHOUT ABSCESS: Chronic | ICD-10-CM

## 2024-12-10 PROCEDURE — 77049 MRI BREAST C-+ W/CAD BI: CPT | Mod: 26

## 2024-12-12 ENCOUNTER — TRANSCRIPTION ENCOUNTER (OUTPATIENT)
Age: 59
End: 2024-12-12

## 2025-07-15 ENCOUNTER — APPOINTMENT (OUTPATIENT)
Dept: MAMMOGRAPHY | Facility: CLINIC | Age: 60
End: 2025-07-15
Payer: COMMERCIAL

## 2025-07-15 ENCOUNTER — APPOINTMENT (OUTPATIENT)
Dept: ULTRASOUND IMAGING | Facility: CLINIC | Age: 60
End: 2025-07-15

## 2025-07-15 ENCOUNTER — APPOINTMENT (OUTPATIENT)
Dept: RADIOLOGY | Facility: CLINIC | Age: 60
End: 2025-07-15
Payer: COMMERCIAL

## 2025-07-15 PROCEDURE — 77067 SCR MAMMO BI INCL CAD: CPT

## 2025-07-15 PROCEDURE — 77063 BREAST TOMOSYNTHESIS BI: CPT

## 2025-07-15 PROCEDURE — 76641 ULTRASOUND BREAST COMPLETE: CPT | Mod: 50

## 2025-07-15 PROCEDURE — 77080 DXA BONE DENSITY AXIAL: CPT

## 2025-07-22 ENCOUNTER — APPOINTMENT (OUTPATIENT)
Dept: MAMMOGRAPHY | Facility: CLINIC | Age: 60
End: 2025-07-22

## 2025-07-22 ENCOUNTER — APPOINTMENT (OUTPATIENT)
Dept: RADIOLOGY | Facility: CLINIC | Age: 60
End: 2025-07-22

## 2025-07-22 ENCOUNTER — APPOINTMENT (OUTPATIENT)
Dept: ULTRASOUND IMAGING | Facility: CLINIC | Age: 60
End: 2025-07-22

## 2025-08-07 ENCOUNTER — APPOINTMENT (OUTPATIENT)
Dept: ULTRASOUND IMAGING | Facility: CLINIC | Age: 60
End: 2025-08-07
Payer: COMMERCIAL

## 2025-08-07 ENCOUNTER — OUTPATIENT (OUTPATIENT)
Dept: OUTPATIENT SERVICES | Facility: HOSPITAL | Age: 60
LOS: 1 days | End: 2025-08-07

## 2025-08-07 DIAGNOSIS — L05.91 PILONIDAL CYST WITHOUT ABSCESS: Chronic | ICD-10-CM

## 2025-08-07 PROCEDURE — 76856 US EXAM PELVIC COMPLETE: CPT | Mod: 26

## 2025-08-07 PROCEDURE — 76830 TRANSVAGINAL US NON-OB: CPT | Mod: 26
